# Patient Record
Sex: FEMALE | URBAN - METROPOLITAN AREA
[De-identification: names, ages, dates, MRNs, and addresses within clinical notes are randomized per-mention and may not be internally consistent; named-entity substitution may affect disease eponyms.]

---

## 2017-01-27 ENCOUNTER — APPOINTMENT (RX ONLY)
Dept: URBAN - METROPOLITAN AREA CLINIC 158 | Facility: CLINIC | Age: 27
Setting detail: DERMATOLOGY
End: 2017-01-27

## 2017-01-27 DIAGNOSIS — L30.0 NUMMULAR DERMATITIS: ICD-10-CM

## 2017-01-27 DIAGNOSIS — L70.0 ACNE VULGARIS: ICD-10-CM

## 2017-01-27 PROCEDURE — ? PRESCRIPTION

## 2017-01-27 PROCEDURE — ? TREATMENT REGIMEN

## 2017-01-27 PROCEDURE — 99202 OFFICE O/P NEW SF 15 MIN: CPT

## 2017-01-27 PROCEDURE — ? COUNSELING

## 2017-01-27 RX ORDER — SPIRONOLACTONE 25 MG/1
TABLET, FILM COATED ORAL BID
Qty: 60 | Refills: 5 | Status: ERX | COMMUNITY
Start: 2017-01-27

## 2017-01-27 RX ORDER — TRIAMCINOLONE ACETONIDE 0.25 MG/ML
LOTION TOPICAL
Qty: 60 | Refills: 5 | Status: ERX | COMMUNITY
Start: 2017-01-27

## 2017-01-27 RX ADMIN — TRIAMCINOLONE ACETONIDE: 0.25 LOTION TOPICAL at 15:12

## 2017-01-27 RX ADMIN — SPIRONOLACTONE: 25 TABLET, FILM COATED ORAL at 15:09

## 2017-01-27 ASSESSMENT — LOCATION DETAILED DESCRIPTION DERM
LOCATION DETAILED: RIGHT DISTAL PRETIBIAL REGION
LOCATION DETAILED: LEFT LOWER CUTANEOUS LIP
LOCATION DETAILED: RIGHT POSTERIOR SHOULDER

## 2017-01-27 ASSESSMENT — LOCATION SIMPLE DESCRIPTION DERM
LOCATION SIMPLE: RIGHT SHOULDER
LOCATION SIMPLE: LEFT LIP
LOCATION SIMPLE: RIGHT PRETIBIAL REGION

## 2017-01-27 ASSESSMENT — LOCATION ZONE DERM
LOCATION ZONE: LIP
LOCATION ZONE: ARM
LOCATION ZONE: LEG

## 2017-01-27 ASSESSMENT — SEVERITY ASSESSMENT: SEVERITY: MILD

## 2017-01-27 ASSESSMENT — BSA RASH: BSA RASH: 15

## 2017-01-27 ASSESSMENT — SEVERITY ASSESSMENT OVERALL AMONG ALL PATIENTS
IN YOUR EXPERIENCE, AMONG ALL PATIENTS YOU HAVE SEEN WITH THIS CONDITION, HOW SEVERE IS THIS PATIENT'S CONDITION?: FEW INFLAMMATORY LESIONS, SOME NONINFLAMMATORY

## 2017-01-27 NOTE — PROCEDURE: TREATMENT REGIMEN
Initiate Treatment: Spironolactone 25mg BID and Aczone 7.5% gel BID
Initiate Treatment: TAC Lotion BID
Samples Given: Aczone with co-pay card
Detail Level: Zone

## 2017-03-15 ENCOUNTER — APPOINTMENT (RX ONLY)
Dept: URBAN - METROPOLITAN AREA CLINIC 158 | Facility: CLINIC | Age: 27
Setting detail: DERMATOLOGY
End: 2017-03-15

## 2017-03-15 DIAGNOSIS — L70.0 ACNE VULGARIS: ICD-10-CM | Status: UNCHANGED

## 2017-03-15 PROBLEM — L85.3 XEROSIS CUTIS: Status: ACTIVE | Noted: 2017-03-15

## 2017-03-15 PROBLEM — F41.9 ANXIETY DISORDER, UNSPECIFIED: Status: ACTIVE | Noted: 2017-03-15

## 2017-03-15 PROBLEM — L29.8 OTHER PRURITUS: Status: ACTIVE | Noted: 2017-03-15

## 2017-03-15 PROBLEM — J30.1 ALLERGIC RHINITIS DUE TO POLLEN: Status: ACTIVE | Noted: 2017-03-15

## 2017-03-15 PROBLEM — L20.84 INTRINSIC (ALLERGIC) ECZEMA: Status: ACTIVE | Noted: 2017-03-15

## 2017-03-15 PROCEDURE — ? PRESCRIPTION

## 2017-03-15 PROCEDURE — ? TREATMENT REGIMEN

## 2017-03-15 PROCEDURE — 99213 OFFICE O/P EST LOW 20 MIN: CPT

## 2017-03-15 PROCEDURE — ? COUNSELING

## 2017-03-15 RX ORDER — CLINDAMYCIN PHOSPHATE AND BENZOYL PEROXIDE 10; 37.5 MG/G; MG/G
GEL TOPICAL
Qty: 50 | Refills: 5 | Status: ERX | COMMUNITY
Start: 2017-03-15

## 2017-03-15 RX ADMIN — CLINDAMYCIN PHOSPHATE AND BENZOYL PEROXIDE: 10; 37.5 GEL TOPICAL at 13:57

## 2017-03-15 ASSESSMENT — LOCATION DETAILED DESCRIPTION DERM: LOCATION DETAILED: LEFT INFERIOR CENTRAL MALAR CHEEK

## 2017-03-15 ASSESSMENT — LOCATION ZONE DERM: LOCATION ZONE: FACE

## 2017-03-15 ASSESSMENT — LOCATION SIMPLE DESCRIPTION DERM: LOCATION SIMPLE: LEFT CHEEK

## 2017-03-15 NOTE — PROCEDURE: TREATMENT REGIMEN
Discontinue Regimen: Aczone D/T dryness , Spironolactone 25mg D/T Not helpful
Initiate Treatment: Solodyn 55mg , Onexton
Samples Given: Solodyn 55mg, Cetaphil Foam Wash, Cetaphil Moisturizer, Cerave Moisturizing
Detail Level: Detailed
Plan: Pt to call if solodyn is helpful and okay to send in RX if so- send to [facility]'s pharmacy

## 2017-03-27 ENCOUNTER — RX ONLY (OUTPATIENT)
Age: 27
Setting detail: RX ONLY
End: 2017-03-27

## 2017-03-27 RX ORDER — ERYTHROMYCIN AND BENZOYL PEROXIDE 3 %-5 %
KIT TOPICAL
Qty: 46.6 | Refills: 5 | Status: ERX | COMMUNITY
Start: 2017-03-27